# Patient Record
Sex: MALE | Race: WHITE | NOT HISPANIC OR LATINO | URBAN - METROPOLITAN AREA
[De-identification: names, ages, dates, MRNs, and addresses within clinical notes are randomized per-mention and may not be internally consistent; named-entity substitution may affect disease eponyms.]

---

## 2017-01-06 ENCOUNTER — IMPORTED ENCOUNTER (OUTPATIENT)
Dept: URBAN - METROPOLITAN AREA CLINIC 50 | Facility: CLINIC | Age: 72
End: 2017-01-06

## 2017-01-06 NOTE — PATIENT DISCUSSION
"""Informed patient that their capsular opacification is not visually significant or does not meet ""

## 2018-01-29 ENCOUNTER — IMPORTED ENCOUNTER (OUTPATIENT)
Dept: URBAN - METROPOLITAN AREA CLINIC 50 | Facility: CLINIC | Age: 73
End: 2018-01-29

## 2018-10-15 ENCOUNTER — IMPORTED ENCOUNTER (OUTPATIENT)
Dept: URBAN - METROPOLITAN AREA CLINIC 50 | Facility: CLINIC | Age: 73
End: 2018-10-15

## 2018-10-17 ENCOUNTER — IMPORTED ENCOUNTER (OUTPATIENT)
Dept: URBAN - METROPOLITAN AREA CLINIC 50 | Facility: CLINIC | Age: 73
End: 2018-10-17

## 2018-10-25 ENCOUNTER — IMPORTED ENCOUNTER (OUTPATIENT)
Dept: URBAN - METROPOLITAN AREA CLINIC 50 | Facility: CLINIC | Age: 73
End: 2018-10-25

## 2019-01-14 ENCOUNTER — IMPORTED ENCOUNTER (OUTPATIENT)
Dept: URBAN - METROPOLITAN AREA CLINIC 50 | Facility: CLINIC | Age: 74
End: 2019-01-14

## 2019-03-21 ENCOUNTER — IMPORTED ENCOUNTER (OUTPATIENT)
Dept: URBAN - METROPOLITAN AREA CLINIC 50 | Facility: CLINIC | Age: 74
End: 2019-03-21

## 2019-03-22 ENCOUNTER — IMPORTED ENCOUNTER (OUTPATIENT)
Dept: URBAN - METROPOLITAN AREA CLINIC 50 | Facility: CLINIC | Age: 74
End: 2019-03-22

## 2020-02-06 ENCOUNTER — IMPORTED ENCOUNTER (OUTPATIENT)
Dept: URBAN - METROPOLITAN AREA CLINIC 50 | Facility: CLINIC | Age: 75
End: 2020-02-06

## 2020-05-18 ENCOUNTER — IMPORTED ENCOUNTER (OUTPATIENT)
Dept: URBAN - METROPOLITAN AREA CLINIC 50 | Facility: CLINIC | Age: 75
End: 2020-05-18

## 2021-01-25 ENCOUNTER — IMPORTED ENCOUNTER (OUTPATIENT)
Dept: URBAN - METROPOLITAN AREA CLINIC 50 | Facility: CLINIC | Age: 76
End: 2021-01-25

## 2021-04-18 ASSESSMENT — TONOMETRY
OS_IOP_MMHG: 14
OD_IOP_MMHG: 16
OS_IOP_MMHG: 16
OS_IOP_MMHG: 17
OS_IOP_MMHG: 18
OD_IOP_MMHG: 16
OD_IOP_MMHG: 14
OD_IOP_MMHG: 15
OD_IOP_MMHG: 18
OS_IOP_MMHG: 11
OS_IOP_MMHG: 15
OS_IOP_MMHG: 15
OD_IOP_MMHG: 16
OD_IOP_MMHG: 18
OD_IOP_MMHG: 17
OS_IOP_MMHG: 13

## 2021-04-18 ASSESSMENT — VISUAL ACUITY
OD_PH: 20/25
OS_CC: J1+@ 13 IN
OD_BAT: 20/30
OD_CC: 20/25
OS_CC: 20/20
OS_BAT: 20/40
OS_CC: 20/20-1
OD_CC: J1+@ 13 IN
OD_OTHER: 20/40. 20/60.
OD_CC: J1+@ 16 IN
OS_OTHER: 20/30. 20/50.
OD_CC: J1@ 16 IN
OS_PH: 20/30
OD_BAT: 20/25
OD_BAT: 20/40
OS_PH: 20/25
OD_CC: 20/25+1
OS_OTHER: 20/60.
OS_OTHER: 20/40. 20/60.
OS_CC: J1+@ 16 IN
OD_CC: 20/20-1
OS_CC: J1+
OS_CC: J1+
OD_CC: 20/20
OS_CC: J1+
OD_OTHER: 20/30. 20/50.
OS_CC: 20/30+
OD_CC: J1+
OS_CC: J1@ 16 IN
OD_CC: J1+
OS_CC: J1+
OS_PH: 20/30
OD_PH: 20/30
OD_CC: J1+
OD_CC: 20/25
OS_BAT: 20/25
OS_CC: 20/40+
OS_BAT: 20/30
OD_OTHER: 20/60.
OD_CC: 20/25+-
OD_CC: 20/20
OS_CC: 20/20
OD_CC: J1+
OD_CC: 20/25

## 2022-01-07 ENCOUNTER — PREPPED CHART (OUTPATIENT)
Dept: URBAN - METROPOLITAN AREA CLINIC 53 | Facility: CLINIC | Age: 77
End: 2022-01-07

## 2022-01-24 ENCOUNTER — COMPREHENSIVE EXAM (OUTPATIENT)
Dept: URBAN - METROPOLITAN AREA CLINIC 53 | Facility: CLINIC | Age: 77
End: 2022-01-24

## 2022-01-24 DIAGNOSIS — H35.372: ICD-10-CM

## 2022-01-24 DIAGNOSIS — H43.812: ICD-10-CM

## 2022-01-24 DIAGNOSIS — H35.3110: ICD-10-CM

## 2022-01-24 PROCEDURE — 92014 COMPRE OPH EXAM EST PT 1/>: CPT

## 2022-01-24 PROCEDURE — 92134 CPTRZ OPH DX IMG PST SGM RTA: CPT

## 2022-01-24 ASSESSMENT — KERATOMETRY
OD_AXISANGLE_DEGREES: 77
OS_AXISANGLE2_DEGREES: 130
OD_K2POWER_DIOPTERS: 41.50
OS_K1POWER_DIOPTERS: 41.50
OD_K1POWER_DIOPTERS: 41.25
OS_AXISANGLE_DEGREES: 40
OS_K2POWER_DIOPTERS: 41.75
OD_AXISANGLE2_DEGREES: 167

## 2022-01-24 ASSESSMENT — VISUAL ACUITY
OD_CC: 20/20-2
OS_CC: 20/20-1
OS_GLARE: 20/20
OD_GLARE: 20/20
OS_GLARE: 20/20
OD_GLARE: 20/20
OU_CC: J1+

## 2022-01-24 ASSESSMENT — TONOMETRY
OS_IOP_MMHG: 16
OD_IOP_MMHG: 16

## 2024-02-21 ENCOUNTER — COMPREHENSIVE EXAM (OUTPATIENT)
Dept: URBAN - METROPOLITAN AREA CLINIC 48 | Facility: LOCATION | Age: 79
End: 2024-02-21

## 2024-02-21 DIAGNOSIS — H52.4: ICD-10-CM

## 2024-02-21 DIAGNOSIS — H35.3132: ICD-10-CM

## 2024-02-21 DIAGNOSIS — H33.302: ICD-10-CM

## 2024-02-21 DIAGNOSIS — H43.813: ICD-10-CM

## 2024-02-21 DIAGNOSIS — H26.493: ICD-10-CM

## 2024-02-21 DIAGNOSIS — H33.012: ICD-10-CM

## 2024-02-21 DIAGNOSIS — H35.372: ICD-10-CM

## 2024-02-21 DIAGNOSIS — H04.123: ICD-10-CM

## 2024-02-21 PROCEDURE — 99214 OFFICE O/P EST MOD 30 MIN: CPT

## 2024-02-21 PROCEDURE — 92015 DETERMINE REFRACTIVE STATE: CPT

## 2024-02-21 PROCEDURE — 92134 CPTRZ OPH DX IMG PST SGM RTA: CPT

## 2024-02-21 RX ORDER — CYCLOSPORINE 0.5 MG/ML
1 EMULSION OPHTHALMIC TWICE A DAY
Start: 2024-02-21

## 2024-02-21 ASSESSMENT — KERATOMETRY
OD_K1POWER_DIOPTERS: 41.50
OS_AXISANGLE_DEGREES: 170
OD_AXISANGLE2_DEGREES: 80
OS_K1POWER_DIOPTERS: 41.75
OS_AXISANGLE2_DEGREES: 80
OS_K2POWER_DIOPTERS: 41.25
OD_K2POWER_DIOPTERS: 41.25
OD_AXISANGLE_DEGREES: 170

## 2024-02-21 ASSESSMENT — TONOMETRY
OS_IOP_MMHG: 15
OD_IOP_MMHG: 15

## 2024-02-21 ASSESSMENT — VISUAL ACUITY
OU_CC: 20/20-1
OS_CC: 20/20-1
OS_GLARE: 20/25
OU_CC: J1+@15"
OD_GLARE: 20/25-1
OD_GLARE: 20/25
OD_CC: 20/20-2
OS_GLARE: 20/25

## 2025-02-26 ENCOUNTER — COMPREHENSIVE EXAM (OUTPATIENT)
Age: 80
End: 2025-02-26

## 2025-02-26 DIAGNOSIS — H33.012: ICD-10-CM

## 2025-02-26 DIAGNOSIS — H04.123: ICD-10-CM

## 2025-02-26 DIAGNOSIS — H35.3132: ICD-10-CM

## 2025-02-26 DIAGNOSIS — H43.813: ICD-10-CM

## 2025-02-26 DIAGNOSIS — H35.372: ICD-10-CM

## 2025-02-26 DIAGNOSIS — H33.302: ICD-10-CM

## 2025-02-26 DIAGNOSIS — H52.4: ICD-10-CM

## 2025-02-26 DIAGNOSIS — H26.493: ICD-10-CM

## 2025-02-26 PROCEDURE — 66821 AFTER CATARACT LASER SURGERY: CPT

## 2025-02-26 PROCEDURE — 92134 CPTRZ OPH DX IMG PST SGM RTA: CPT

## 2025-02-26 PROCEDURE — 99214 OFFICE O/P EST MOD 30 MIN: CPT | Mod: 57

## 2025-02-26 RX ORDER — MINERAL OIL 2; 2 MG/.4ML; MG/.4ML
1 EMULSION OPHTHALMIC TWICE A DAY
Start: 2025-02-26

## 2025-02-26 RX ORDER — POLYETHYLENE GLYCOL 400 2.5 MG/ML
1 SOLUTION/ DROPS OPHTHALMIC
Start: 2025-02-26

## 2025-04-09 ENCOUNTER — POST-OP (OUTPATIENT)
Age: 80
End: 2025-04-09

## 2025-04-09 DIAGNOSIS — Z98.890: ICD-10-CM

## 2025-04-09 PROCEDURE — 99024 POSTOP FOLLOW-UP VISIT: CPT

## 2025-04-09 RX ORDER — CYCLOSPORINE 0 MG/ML
1 SOLUTION/ DROPS OPHTHALMIC; TOPICAL TWICE A DAY
Start: 2025-04-09